# Patient Record
Sex: FEMALE | Race: OTHER | NOT HISPANIC OR LATINO
[De-identification: names, ages, dates, MRNs, and addresses within clinical notes are randomized per-mention and may not be internally consistent; named-entity substitution may affect disease eponyms.]

---

## 2024-01-04 ENCOUNTER — APPOINTMENT (OUTPATIENT)
Dept: SURGERY | Facility: CLINIC | Age: 44
End: 2024-01-04
Payer: COMMERCIAL

## 2024-01-04 VITALS — DIASTOLIC BLOOD PRESSURE: 90 MMHG | SYSTOLIC BLOOD PRESSURE: 163 MMHG | HEART RATE: 53 BPM | OXYGEN SATURATION: 100 %

## 2024-01-04 DIAGNOSIS — Z83.49 FAMILY HISTORY OF OTHER ENDOCRINE, NUTRITIONAL AND METABOLIC DISEASES: ICD-10-CM

## 2024-01-04 DIAGNOSIS — E07.9 DISORDER OF THYROID, UNSPECIFIED: ICD-10-CM

## 2024-01-04 PROBLEM — Z00.00 ENCOUNTER FOR PREVENTIVE HEALTH EXAMINATION: Status: ACTIVE | Noted: 2024-01-04

## 2024-01-04 PROCEDURE — 99203 OFFICE O/P NEW LOW 30 MIN: CPT

## 2024-01-04 RX ORDER — LEVOTHYROXINE SODIUM 200 UG/1
CAPSULE ORAL
Refills: 0 | Status: ACTIVE | COMMUNITY

## 2024-01-04 NOTE — PHYSICAL EXAM
[Alert] : alert [Oriented to Person] : oriented to person [Oriented to Place] : oriented to place [Oriented to Time] : oriented to time [Calm] : calm [de-identified] : She  is alert, well-groomed, and in NAD   [de-identified] : anicteric.  Nasal mucosa pink, septum midline. Oral mucosa pink.  Tongue midline, Pharynx without exudates.   [de-identified] : Neck supple. Trachea midline. Thyroid isthmus barely palpable, lobes not felt.   [de-identified] : very lumpy breast . right breast 0100 4cm FN palpable mass. left breast    No palpable nodules, masses, tenderness, or axillary or supraclavicular adenopathy.  No nipple discharge. no skin retraction

## 2024-01-04 NOTE — CONSULT LETTER
[Dear  ___] : Dear  [unfilled], [Consult Letter:] : I had the pleasure of evaluating your patient, [unfilled]. [Please see my note below.] : Please see my note below. [Consult Closing:] : Thank you very much for allowing me to participate in the care of this patient.  If you have any questions, please do not hesitate to contact me. [Sincerely,] : Sincerely, [FreeTextEntry3] : Shan Johnson MD, FACS

## 2024-01-04 NOTE — ASSESSMENT
[FreeTextEntry1] : Impression:  BL breast masses  Left breast mass biopsy proven intraductal papilloma - patient needs excision

## 2024-01-04 NOTE — PLAN
[FreeTextEntry1] : Ms. VERDUZCO  was told significance of findings, options, risks and benefits were explained.     All surgical options were discussed including non-surgical treatments.   she was advised to have BL breast US guided biopsies and return after  the biopsies to discuss the surgery.  Patient advised to seek immediate medical attention with any acute change in symptoms or with the development of any new or worsening symptoms.  Patient's questions and concerns addressed to patient's satisfaction, and patient verbalized an understanding of the information discussed.

## 2024-01-04 NOTE — HISTORY OF PRESENT ILLNESS
[de-identified] : Patient is a 43 year -old female  who was referred by Dr. Minda Wu with the chief complaint of having a left breast mass.  She  denies any trauma to the breast, denies  skin changes  and She has no spontaneous nipple discharge. She  denies any fever, night sweats or loss of appetite.    There is no family history of breast carcinoma.   Menarche at age 16, -2 , para-2.   Date of  her last menstrual period: last month.   Patient is on no hormonal replacement therapy.   A Sono-guided biopsy of the  left breast 0600 was done on 2023 .  Pathology results were consistent with benign findings.   Patient  had a  BL mammogram   on 2023, results are not available.  Ms. VERDUZCO  is s/p BL breast US on 2023 that showed multiple BL breast cysts and showed   complex lesions ( 2 in each breast)    in BL breast . It was deemed BIRADS 4.    Ms. VERDUZCO  is s/p US guided biopsy of the left breast 1200 , 8mm lesion and it showed intraductal papilloma.  she is scheduled to have tomorrow US guided biopsy in the right breast  and on  of the left breast    Undermining Type: Entire Wound

## 2024-01-22 ENCOUNTER — APPOINTMENT (OUTPATIENT)
Dept: SURGERY | Facility: CLINIC | Age: 44
End: 2024-01-22
Payer: COMMERCIAL

## 2024-01-22 VITALS
HEART RATE: 58 BPM | OXYGEN SATURATION: 100 % | BODY MASS INDEX: 21.99 KG/M2 | SYSTOLIC BLOOD PRESSURE: 156 MMHG | DIASTOLIC BLOOD PRESSURE: 88 MMHG | HEIGHT: 65 IN | WEIGHT: 132 LBS

## 2024-01-22 DIAGNOSIS — N63.10 UNSPECIFIED LUMP IN THE RIGHT BREAST, UNSPECIFIED QUADRANT: ICD-10-CM

## 2024-01-22 PROCEDURE — 99213 OFFICE O/P EST LOW 20 MIN: CPT

## 2024-01-22 NOTE — ASSESSMENT
[FreeTextEntry1] : Impression:  Left breast mass biopsy proven intraductal papilloma - patient needs excision. Right breast biopsy results x2 sites  are benign according to the pathology reports.

## 2024-01-22 NOTE — PLAN
[FreeTextEntry1] : Ms. VERDUZCO  was told significance of findings, options, risks and benefits were explained.  Informed consent for excision left breast mass with spot localization , and potential risks, benefits and alternatives (surgical options were discussed including non-surgical options or the option of no surgery) to the planned surgery were discussed in depth.  All surgical options were discussed including non-surgical treatments.  She wishes to proceed with surgery.  We will plan for surgery on at the next available date, pending any required insurance pre-certification or pre-approval. She agrees to obtain any necessary pre-operative evaluations and testing prior to surgery. Patient advised to seek immediate medical attention with any acute change in symptoms or with the development of any new or worsening symptoms.  Patient's questions and concerns addressed to patient's satisfaction, and patient verbalized an understanding of the information discussed.

## 2024-01-22 NOTE — PHYSICAL EXAM
[Alert] : alert [Oriented to Person] : oriented to person [Oriented to Place] : oriented to place [Oriented to Time] : oriented to time [Calm] : calm [de-identified] : She  is alert, well-groomed, and in NAD   [de-identified] : anicteric.  Nasal mucosa pink, septum midline. Oral mucosa pink.  Tongue midline, Pharynx without exudates.   [de-identified] : Neck supple. Trachea midline. Thyroid isthmus barely palpable, lobes not felt.   [de-identified] : very lumpy breast .      Breast are symmetric,  No palpable nodules, masses, tenderness, or axillary or supraclavicular adenopathy. No nipple discharge. no skin retraction

## 2024-01-22 NOTE — HISTORY OF PRESENT ILLNESS
[de-identified] : Patient is a 43 year -old female  who was referred by Dr. Minad Wu with the chief complaint of having a left breast mass.   she is presenting to discuss her  recent right breast biopsies . She  denies any trauma to the breast, denies  skin changes  and She has no spontaneous nipple discharge. She  denies any fever, night sweats or loss of appetite.    There is no family history of breast carcinoma.   Menarche at age 16, -2 , para-2.   Date of  her last menstrual period: last month.   Patient is on no hormonal replacement therapy.   A Sono-guided biopsy of the  left breast 0600 was done on 2023 .  Pathology results were consistent with benign findings.   Patient  had a  BL mammogram   on 2023, results are not available.  Ms. VERDUZCO  is s/p BL breast US on 2023 that showed multiple BL breast cysts and showed   complex lesions ( 2 in each breast)    in BL breast . It was deemed BIRADS 4.    Ms. VERDUZCO  is s/p US guided biopsy of the left breast 1200 , 8mm lesion and it showed intraductal papilloma.   Ms. VERDUZCO  is s/p  US guided biopsy in the right breast on 2024  0700  -  Patient's pathology results were  consistent with benign findings , fibroadenomatoid changes  Ms. VERDUZCO  is s/p  US guided biopsy    of the  right breast  on 2024   0800 - Patient's pathology results were  consistent with benign findings , cyst  concordance was not documented

## 2024-01-30 ENCOUNTER — OUTPATIENT (OUTPATIENT)
Dept: OUTPATIENT SERVICES | Facility: HOSPITAL | Age: 44
LOS: 1 days | End: 2024-01-30

## 2024-01-30 DIAGNOSIS — N63.20 UNSPECIFIED LUMP IN THE LEFT BREAST, UNSPECIFIED QUADRANT: ICD-10-CM

## 2024-01-31 ENCOUNTER — TRANSCRIPTION ENCOUNTER (OUTPATIENT)
Age: 44
End: 2024-01-31

## 2024-02-01 ENCOUNTER — OUTPATIENT (OUTPATIENT)
Dept: OUTPATIENT SERVICES | Facility: HOSPITAL | Age: 44
LOS: 1 days | End: 2024-02-01
Payer: COMMERCIAL

## 2024-02-01 VITALS
HEIGHT: 65 IN | WEIGHT: 132.06 LBS | HEART RATE: 60 BPM | SYSTOLIC BLOOD PRESSURE: 138 MMHG | DIASTOLIC BLOOD PRESSURE: 82 MMHG | RESPIRATION RATE: 16 BRPM | TEMPERATURE: 98 F | OXYGEN SATURATION: 100 %

## 2024-02-01 DIAGNOSIS — E03.9 HYPOTHYROIDISM, UNSPECIFIED: ICD-10-CM

## 2024-02-01 DIAGNOSIS — Z78.9 OTHER SPECIFIED HEALTH STATUS: Chronic | ICD-10-CM

## 2024-02-01 DIAGNOSIS — N63.20 UNSPECIFIED LUMP IN THE LEFT BREAST, UNSPECIFIED QUADRANT: ICD-10-CM

## 2024-02-01 DIAGNOSIS — Z01.818 ENCOUNTER FOR OTHER PREPROCEDURAL EXAMINATION: ICD-10-CM

## 2024-02-01 NOTE — H&P PST ADULT - NSICDXFAMILYHX_GEN_ALL_CORE_FT
FAMILY HISTORY:  Mother  Still living? Yes, Estimated age: 65  FH: hypothyroidism, Age at diagnosis: Age Unknown

## 2024-02-01 NOTE — H&P PST ADULT - NSICDXPROCEDURE_GEN_ALL_CORE_FT
PROCEDURES:  Excision, mass, breast, using radiological marker 01-Feb-2024 11:41:29  Zenaida Kinney

## 2024-02-01 NOTE — H&P PST ADULT - PROBLEM SELECTOR PLAN 2
scheduled for excision of left breast mass with pre-operative needle localization.        Pt instructed to be NPO the night before and the morning of surgery except for po med. Provided with chlorhexidine4% solution to wash for 3 days including the day of surgery. Written instructions given and reviewed with pt. Tylenol only to be used prior to surgery. Escort required post procedure.     HUSSEIN-0

## 2024-02-01 NOTE — H&P PST ADULT - NSICDXPASTMEDICALHX_GEN_ALL_CORE_FT
PAST MEDICAL HISTORY:  Hypothyroid     Unspecified lump in the left breast, unspecified quadrant

## 2024-02-01 NOTE — H&P PST ADULT - ASSESSMENT
43 yr old female with history of hypothyroid presents with unspecified lump in the left breast unspecified quadrant. Pt scheduled for excision of left breast mass with pre-operative needle localization 2/9/2024.

## 2024-02-01 NOTE — H&P PST ADULT - HISTORY OF PRESENT ILLNESS
43 yr old female accompanied by , history of hypothyroid (Levothyroxine) presents with unspecified lump in the left breast, unspecified quadrant. Pt was evaluated by surgeon and scheduled for excision of left breast mass with pre-operative needle localization on 2/9/2024.

## 2024-02-02 PROCEDURE — G0463: CPT

## 2024-02-08 ENCOUNTER — TRANSCRIPTION ENCOUNTER (OUTPATIENT)
Age: 44
End: 2024-02-08

## 2024-02-09 ENCOUNTER — RESULT REVIEW (OUTPATIENT)
Age: 44
End: 2024-02-09

## 2024-02-09 ENCOUNTER — TRANSCRIPTION ENCOUNTER (OUTPATIENT)
Age: 44
End: 2024-02-09

## 2024-02-09 ENCOUNTER — OUTPATIENT (OUTPATIENT)
Dept: OUTPATIENT SERVICES | Facility: HOSPITAL | Age: 44
LOS: 1 days | End: 2024-02-09
Payer: COMMERCIAL

## 2024-02-09 ENCOUNTER — APPOINTMENT (OUTPATIENT)
Dept: SURGERY | Facility: HOSPITAL | Age: 44
End: 2024-02-09
Payer: COMMERCIAL

## 2024-02-09 VITALS
TEMPERATURE: 98 F | HEART RATE: 57 BPM | OXYGEN SATURATION: 100 % | SYSTOLIC BLOOD PRESSURE: 138 MMHG | WEIGHT: 132.06 LBS | RESPIRATION RATE: 16 BRPM | HEIGHT: 65 IN | DIASTOLIC BLOOD PRESSURE: 79 MMHG

## 2024-02-09 VITALS
HEART RATE: 53 BPM | RESPIRATION RATE: 15 BRPM | DIASTOLIC BLOOD PRESSURE: 56 MMHG | OXYGEN SATURATION: 100 % | SYSTOLIC BLOOD PRESSURE: 107 MMHG | TEMPERATURE: 98 F

## 2024-02-09 DIAGNOSIS — Z78.9 OTHER SPECIFIED HEALTH STATUS: Chronic | ICD-10-CM

## 2024-02-09 DIAGNOSIS — N63.20 UNSPECIFIED LUMP IN THE LEFT BREAST, UNSPECIFIED QUADRANT: ICD-10-CM

## 2024-02-09 LAB — HCG UR QL: NEGATIVE — SIGNIFICANT CHANGE UP

## 2024-02-09 PROCEDURE — 76098 X-RAY EXAM SURGICAL SPECIMEN: CPT | Mod: 26

## 2024-02-09 PROCEDURE — 19281 PERQ DEVICE BREAST 1ST IMAG: CPT | Mod: LT

## 2024-02-09 PROCEDURE — 19125 EXCISION BREAST LESION: CPT | Mod: LT

## 2024-02-09 PROCEDURE — 88307 TISSUE EXAM BY PATHOLOGIST: CPT | Mod: 26

## 2024-02-09 PROCEDURE — 88307 TISSUE EXAM BY PATHOLOGIST: CPT

## 2024-02-09 PROCEDURE — 19281 PERQ DEVICE BREAST 1ST IMAG: CPT

## 2024-02-09 PROCEDURE — 76098 X-RAY EXAM SURGICAL SPECIMEN: CPT

## 2024-02-09 PROCEDURE — 81025 URINE PREGNANCY TEST: CPT

## 2024-02-09 RX ORDER — SODIUM CHLORIDE 9 MG/ML
1000 INJECTION, SOLUTION INTRAVENOUS
Refills: 0 | Status: DISCONTINUED | OUTPATIENT
Start: 2024-02-09 | End: 2024-02-09

## 2024-02-09 RX ORDER — SODIUM CHLORIDE 9 MG/ML
3 INJECTION INTRAMUSCULAR; INTRAVENOUS; SUBCUTANEOUS EVERY 8 HOURS
Refills: 0 | Status: DISCONTINUED | OUTPATIENT
Start: 2024-02-09 | End: 2024-02-09

## 2024-02-09 RX ORDER — ONDANSETRON 8 MG/1
4 TABLET, FILM COATED ORAL ONCE
Refills: 0 | Status: DISCONTINUED | OUTPATIENT
Start: 2024-02-09 | End: 2024-02-09

## 2024-02-09 RX ORDER — IBUPROFEN 200 MG
400 TABLET ORAL ONCE
Refills: 0 | Status: DISCONTINUED | OUTPATIENT
Start: 2024-02-09 | End: 2024-02-09

## 2024-02-09 RX ORDER — HYDROMORPHONE HYDROCHLORIDE 2 MG/ML
0.5 INJECTION INTRAMUSCULAR; INTRAVENOUS; SUBCUTANEOUS
Refills: 0 | Status: DISCONTINUED | OUTPATIENT
Start: 2024-02-09 | End: 2024-02-09

## 2024-02-09 RX ORDER — OXYCODONE HYDROCHLORIDE 5 MG/1
5 TABLET ORAL ONCE
Refills: 0 | Status: DISCONTINUED | OUTPATIENT
Start: 2024-02-09 | End: 2024-02-09

## 2024-02-09 RX ORDER — HYDROMORPHONE HYDROCHLORIDE 2 MG/ML
1 INJECTION INTRAMUSCULAR; INTRAVENOUS; SUBCUTANEOUS
Refills: 0 | Status: DISCONTINUED | OUTPATIENT
Start: 2024-02-09 | End: 2024-02-09

## 2024-02-09 RX ORDER — LEVOTHYROXINE SODIUM 125 MCG
1 TABLET ORAL
Refills: 0 | DISCHARGE

## 2024-02-09 RX ORDER — ACETAMINOPHEN 500 MG
650 TABLET ORAL ONCE
Refills: 0 | Status: DISCONTINUED | OUTPATIENT
Start: 2024-02-09 | End: 2024-02-09

## 2024-02-09 NOTE — BRIEF OPERATIVE NOTE - NSICDXBRIEFPREOP_GEN_ALL_CORE_FT
PRE-OP DIAGNOSIS:  Left breast mass 09-Feb-2024 12:40:18  Tianna Coy  
PRE-OP DIAGNOSIS:  Left breast mass 09-Feb-2024 12:40:18  Tianna Coy

## 2024-02-09 NOTE — ASU PATIENT PROFILE, ADULT - FALL HARM RISK - UNIVERSAL INTERVENTIONS
Bed in lowest position, wheels locked, appropriate side rails in place/Call bell, personal items and telephone in reach/Instruct patient to call for assistance before getting out of bed or chair/Non-slip footwear when patient is out of bed/[place] to call system/Physically safe environment - no spills, clutter or unnecessary equipment/Purposeful Proactive Rounding/Room/bathroom lighting operational, light cord in reach

## 2024-02-09 NOTE — BRIEF OPERATIVE NOTE - NSICDXBRIEFPROCEDURE_GEN_ALL_CORE_FT
PROCEDURES:  Lumpectomy or partial mastectomy, after needle localization 09-Feb-2024 12:40:08  Tianna Coy  
PROCEDURES:  Lumpectomy or partial mastectomy, after needle localization 09-Feb-2024 12:40:08  Tianna Coy

## 2024-02-09 NOTE — ASU DISCHARGE PLAN (ADULT/PEDIATRIC) - NS MD DC FALL RISK RISK
For information on Fall & Injury Prevention, visit: [email or web address] OR  [email or web address] OR  https://www.cdc.gov/steadi/patient.html

## 2024-02-09 NOTE — BRIEF OPERATIVE NOTE - NSICDXBRIEFPOSTOP_GEN_ALL_CORE_FT
POST-OP DIAGNOSIS:  Left breast mass 09-Feb-2024 12:40:23  Tianna Coy  
POST-OP DIAGNOSIS:  Left breast mass 09-Feb-2024 12:40:23  Tianna Coy

## 2024-02-09 NOTE — ASU DISCHARGE PLAN (ADULT/PEDIATRIC) - CARE PROVIDER_API CALL
Shan Johnson  Surgery  [address] [facility]  [place] [address]  Phone: [phone]  Fax: [phone]  Follow Up Time: 2 weeks

## 2024-02-12 PROBLEM — N63.20 UNSPECIFIED LUMP IN THE LEFT BREAST, UNSPECIFIED QUADRANT: Chronic | Status: ACTIVE | Noted: 2024-02-01

## 2024-02-12 PROBLEM — E03.9 HYPOTHYROIDISM, UNSPECIFIED: Chronic | Status: ACTIVE | Noted: 2024-02-01

## 2024-02-15 ENCOUNTER — APPOINTMENT (OUTPATIENT)
Dept: SURGERY | Facility: CLINIC | Age: 44
End: 2024-02-15
Payer: COMMERCIAL

## 2024-02-15 LAB — SURGICAL PATHOLOGY STUDY: SIGNIFICANT CHANGE UP

## 2024-02-15 PROCEDURE — 99024 POSTOP FOLLOW-UP VISIT: CPT

## 2024-02-15 NOTE — HISTORY OF PRESENT ILLNESS
[de-identified] : Ms. VERDUZCO  is s/p excision left breast mass with spot localization on 2024. Patient's pathology results were  consistent with partially sclerosing intraductal papilloma. Today  Ms. VERDUZCO offers no complaints. patient reports no fever or  chills. patient reports occasional discomfort in the surgical area.  Her surgical incisions are healing well. No signs of inflammation, infection or exudate. patient reports good bowel movements and appetite.   History:  There is no family history of breast carcinoma. Menarche at age 16, -2 , para-2.    A Sono-guided biopsy of the left breast 0600 was done on 2023. Pathology results were consistent with benign findings.  Patient had a BL mammogram on 2023, results are not available. Ms. VERDUZCO is s/p BL breast US on 2023 that showed multiple BL breast cysts and showed complex lesions ( 2 in each breast) in BL breast. It was deemed BIRADS 4.  Ms. VERDUZCO is s/p US guided biopsy of the left breast 1200 , 8mm lesion and it showed intraductal papilloma.  Ms. VERDUZCO is s/p US guided biopsy in the right breast on 2024 0700 - Patient's pathology results were consistent with benign findings , fibroadenomatoid changes Ms. VERDUZCO is s/p US guided biopsy of the right breast on 2024 0800 - Patient's pathology results were consistent with benign findings , cyst concordance was not documented.

## 2024-02-15 NOTE — DATA REVIEWED
[FreeTextEntry1] : Patient:     KENYON VERDUZCO   Accession:                             [id number]  Collected Date/Time:                   2/9/2024 12:20 EST Received Date/Time:                    2/9/2024 12:27 EST  Surgical Pathology Report - Auth (Verified)  Specimen(s) Submitted 1  Left breast mass  Final Diagnosis Mass, left breast; excision with preoperative needle localization: -   Partially sclerosing intraductal papilloma. -   Tissue changes consistent with previous surgical intervention. -   Fibrocystic change harboring microcalcifications and including  mild ductal epithelial hyperplasia, sclerosing and nodular adenosis, stromal fibrosis and cysts. Verified by: Geraldine Gilmore MD (Electronic Signature) Reported on: 02/15/24 11:53 EST, [address] Phone: [phone]   Fax: [phone]

## 2024-02-15 NOTE — PHYSICAL EXAM
[Alert] : alert [Oriented to Person] : oriented to person [Oriented to Place] : oriented to place [Oriented to Time] : oriented to time [Calm] : calm [de-identified] : She  is alert, well-groomed, and in NAD   [de-identified] : anicteric.  Nasal mucosa pink, septum midline. Oral mucosa pink.  Tongue midline, Pharynx without exudates.   [de-identified] : Neck supple. Trachea midline. Thyroid isthmus barely palpable, lobes not felt.   [de-identified] : left breast Surgical wound is healing well.   no signs of  inflammation or infection.

## 2024-02-15 NOTE — ASSESSMENT
[FreeTextEntry1] : Ms. VERDUZCO is doing well, with excellent post-operative recovery. The surgical incision is healing well and as expected. There is no evidence of infection or complication, and patient is progressing as expected. Post-operative wound care, activity, restrictions and precautions reinforced.  Pathology results were discussed in details. Patient's questions and concerns addressed to patient's satisfaction.

## 2024-05-16 PROBLEM — N63.20 LEFT BREAST MASS: Status: ACTIVE | Noted: 2024-01-04

## 2024-05-23 ENCOUNTER — APPOINTMENT (OUTPATIENT)
Dept: SURGERY | Facility: CLINIC | Age: 44
End: 2024-05-23
Payer: COMMERCIAL

## 2024-05-23 VITALS
WEIGHT: 132 LBS | DIASTOLIC BLOOD PRESSURE: 75 MMHG | BODY MASS INDEX: 21.99 KG/M2 | OXYGEN SATURATION: 99 % | SYSTOLIC BLOOD PRESSURE: 117 MMHG | HEIGHT: 65 IN | HEART RATE: 49 BPM

## 2024-05-23 DIAGNOSIS — Z12.39 ENCOUNTER FOR OTHER SCREENING FOR MALIGNANT NEOPLASM OF BREAST: ICD-10-CM

## 2024-05-23 DIAGNOSIS — N63.20 UNSPECIFIED LUMP IN THE LEFT BREAST, UNSPECIFIED QUADRANT: ICD-10-CM

## 2024-05-23 PROCEDURE — 99213 OFFICE O/P EST LOW 20 MIN: CPT

## 2024-05-23 NOTE — PLAN
[FreeTextEntry1] : Ms. VERDUZCO  is presenting  today for an evaluation .  she is doing well and offers no complaints.  Results of  her last   imaging and physical examination findings were discussed in details.  Significance of the findings were discussed.    She  was advised to have  BL    breast US and Mammogram in  November and return after the tests. Importance of monthly self-breast examination was reinforced.  Patient's questions and concerns addressed to patient's satisfaction.       Vitamin E daily for breast pain.  Avoid caffein and Chocolates to prevent breast pain NSAIDs PRN for pain

## 2024-05-23 NOTE — PHYSICAL EXAM
[Alert] : alert [Oriented to Person] : oriented to person [Oriented to Place] : oriented to place [Oriented to Time] : oriented to time [Calm] : calm [de-identified] : She  is alert, well-groomed, and in NAD   [de-identified] : anicteric.  Nasal mucosa pink, septum midline. Oral mucosa pink.  Tongue midline, Pharynx without exudates.   [de-identified] : Neck supple. Trachea midline. Thyroid isthmus barely palpable, lobes not felt.   [de-identified] : left breast well healed scar.   Breast are symmetric,  No palpable nodules, masses, tenderness, or axillary or supraclavicular adenopathy. No nipple discharge. no skin retraction

## 2024-05-23 NOTE — HISTORY OF PRESENT ILLNESS
[de-identified] : This is  a 44 year   old patient presenting today for a breast exam. Patient reports no interval changes to her overall health status or medical history.  She is reporting BL breast pain.   Ms. VERDUZCO denies any trauma to the breast, denies  skin changes  and   she has no spontaneous nipple discharge. Patient denies any fever, night sweats or loss of appetite.    Date of her last menstrual period: May 2 .       There is no family history of breast carcinoma.   Patient is on no hormonal replacement therapy.   History: There is no family history of breast carcinoma. Menarche at age 16, -2 , para-2.  A Sono-guided biopsy of the left breast 0600 was done on 2023. Pathology results were consistent with benign findings.  Patient had a BL mammogram on 2023, results are not available. Ms. VERDUZCO is s/p BL breast US on 2023 that showed multiple BL breast cysts and showed complex lesions ( 2 in each breast) in BL breast. It was deemed BIRADS 4.  Ms. VERDUZCO is s/p US guided biopsy of the left breast 1200 , 8mm lesion and it showed intraductal papilloma.  Ms. VERDUZCO is s/p US guided biopsy in the right breast on 2024 0700 - Patient's pathology results were consistent with benign findings , fibroadenomatoid changes Ms. VERDUZCO is s/p US guided biopsy of the right breast on 2024 0800 - Patient's pathology results were consistent with benign findings , cyst concordance was not documented. Ms. VERDUZCO is s/p excision left breast mass with spot localization on 2024. Patient's pathology results were consistent with partially sclerosing intraductal papilloma.

## 2024-11-18 DIAGNOSIS — N63.20 UNSPECIFIED LUMP IN THE LEFT BREAST, UNSPECIFIED QUADRANT: ICD-10-CM

## 2024-12-16 ENCOUNTER — APPOINTMENT (OUTPATIENT)
Dept: SURGERY | Facility: CLINIC | Age: 44
End: 2024-12-16

## 2024-12-16 VITALS
OXYGEN SATURATION: 99 % | HEIGHT: 65 IN | BODY MASS INDEX: 22.66 KG/M2 | HEART RATE: 55 BPM | DIASTOLIC BLOOD PRESSURE: 53 MMHG | WEIGHT: 136 LBS | SYSTOLIC BLOOD PRESSURE: 128 MMHG

## 2024-12-16 DIAGNOSIS — N63.20 UNSPECIFIED LUMP IN THE LEFT BREAST, UNSPECIFIED QUADRANT: ICD-10-CM

## 2024-12-16 PROCEDURE — 19000 PUNCTURE ASPIR CYST BREAST: CPT

## 2024-12-19 ENCOUNTER — NON-APPOINTMENT (OUTPATIENT)
Age: 44
End: 2024-12-19

## 2025-03-17 ENCOUNTER — APPOINTMENT (OUTPATIENT)
Dept: SURGERY | Facility: CLINIC | Age: 45
End: 2025-03-17
Payer: COMMERCIAL

## 2025-03-17 ENCOUNTER — NON-APPOINTMENT (OUTPATIENT)
Age: 45
End: 2025-03-17

## 2025-03-17 VITALS
DIASTOLIC BLOOD PRESSURE: 83 MMHG | BODY MASS INDEX: 21.99 KG/M2 | HEIGHT: 65 IN | SYSTOLIC BLOOD PRESSURE: 133 MMHG | WEIGHT: 132 LBS | OXYGEN SATURATION: 100 % | HEART RATE: 46 BPM

## 2025-03-17 DIAGNOSIS — N63.20 UNSPECIFIED LUMP IN THE LEFT BREAST, UNSPECIFIED QUADRANT: ICD-10-CM

## 2025-03-17 DIAGNOSIS — Z12.39 ENCOUNTER FOR OTHER SCREENING FOR MALIGNANT NEOPLASM OF BREAST: ICD-10-CM

## 2025-03-17 PROCEDURE — 99213 OFFICE O/P EST LOW 20 MIN: CPT

## (undated) DEVICE — DRAPE LAPAROTOMY TRANSVERSE

## (undated) DEVICE — ELCTR GROUNDING PAD ADULT COVIDIEN

## (undated) DEVICE — GLV 7.5 PROTEXIS (WHITE)

## (undated) DEVICE — PREP BETADINE SPONGE STICKS

## (undated) DEVICE — FOR-ESU VALLEYLAB T7E15009DX: Type: DURABLE MEDICAL EQUIPMENT

## (undated) DEVICE — DRSG CURITY GAUZE SPONGE 4 X 4" 12-PLY

## (undated) DEVICE — SOL IRR POUR H2O 500ML

## (undated) DEVICE — SYR LUER LOK 10CC

## (undated) DEVICE — SOL IRR POUR H2O 1500ML

## (undated) DEVICE — SUT POLYSORB 4-0 27" P-12 UNDYED

## (undated) DEVICE — WARMING BLANKET LOWER ADULT

## (undated) DEVICE — PREP CHLORAPREP HI-LITE ORANGE 26ML

## (undated) DEVICE — PACK MINOR NO DRAPE

## (undated) DEVICE — DRAPE 1/2 SHEET 40X57"

## (undated) DEVICE — GOWN XL

## (undated) DEVICE — DRSG TEGADERM 4X4.75"

## (undated) DEVICE — NDL HYPO SAFE 25G X 1.5" (ORANGE)

## (undated) DEVICE — NDL HYPO REGULAR BEVEL 25G X 1.5" (BLUE)

## (undated) DEVICE — SUT SOFSILK 2-0 30" V-20

## (undated) DEVICE — VENODYNE/SCD SLEEVE CALF MEDIUM

## (undated) DEVICE — SUT POLYSORB 3-0 30" V-20 UNDYED

## (undated) DEVICE — DRAPE LIGHT HANDLE COVER (BLUE)

## (undated) DEVICE — DRSG MASTISOL

## (undated) DEVICE — GLV 7 PROTEXIS (WHITE)